# Patient Record
Sex: MALE | Race: WHITE | NOT HISPANIC OR LATINO | URBAN - METROPOLITAN AREA
[De-identification: names, ages, dates, MRNs, and addresses within clinical notes are randomized per-mention and may not be internally consistent; named-entity substitution may affect disease eponyms.]

---

## 2017-02-24 NOTE — PATIENT DISCUSSION
Discussed increased risk of floaters, glaucoma, myopic degeneration and retinal detachment associated with high myopia.

## 2017-06-09 ENCOUNTER — IMPORTED ENCOUNTER (OUTPATIENT)
Dept: URBAN - METROPOLITAN AREA CLINIC 1 | Facility: CLINIC | Age: 72
End: 2017-06-09

## 2017-06-09 PROBLEM — H35.033: Noted: 2017-06-09

## 2017-06-09 PROBLEM — Z79.84: Noted: 2017-06-09

## 2017-06-09 PROBLEM — E11.9: Noted: 2017-06-09

## 2017-06-09 PROBLEM — D31.32: Noted: 2017-06-09

## 2017-06-09 PROBLEM — H25.813: Noted: 2017-06-09

## 2017-06-09 PROBLEM — H35.3132: Noted: 2017-06-09

## 2017-06-09 PROBLEM — H04.123: Noted: 2017-06-09

## 2017-06-09 PROBLEM — H16.143: Noted: 2017-06-09

## 2017-06-09 PROCEDURE — 92012 INTRM OPH EXAM EST PATIENT: CPT

## 2017-06-09 PROCEDURE — 83861 MICROFLUID ANALY TEARS: CPT

## 2017-12-07 ENCOUNTER — IMPORTED ENCOUNTER (OUTPATIENT)
Dept: URBAN - METROPOLITAN AREA CLINIC 1 | Facility: CLINIC | Age: 72
End: 2017-12-07

## 2017-12-07 PROBLEM — H25.813: Noted: 2017-12-07

## 2017-12-07 PROBLEM — E11.9: Noted: 2017-12-07

## 2017-12-07 PROBLEM — H35.033: Noted: 2017-12-07

## 2017-12-07 PROBLEM — H35.3132: Noted: 2017-12-07

## 2017-12-07 PROBLEM — H16.143: Noted: 2017-12-07

## 2017-12-07 PROBLEM — Z79.84: Noted: 2017-12-07

## 2017-12-07 PROBLEM — H04.123: Noted: 2017-12-07

## 2017-12-07 PROBLEM — D31.32: Noted: 2017-12-07

## 2017-12-07 PROCEDURE — 92014 COMPRE OPH EXAM EST PT 1/>: CPT

## 2017-12-07 PROCEDURE — 92015 DETERMINE REFRACTIVE STATE: CPT

## 2018-02-06 ENCOUNTER — IMPORTED ENCOUNTER (OUTPATIENT)
Dept: URBAN - METROPOLITAN AREA CLINIC 1 | Facility: CLINIC | Age: 73
End: 2018-02-06

## 2018-02-06 PROBLEM — H25.812: Noted: 2018-02-06

## 2018-02-06 PROCEDURE — 92136 OPHTHALMIC BIOMETRY: CPT

## 2018-02-14 ENCOUNTER — IMPORTED ENCOUNTER (OUTPATIENT)
Dept: URBAN - METROPOLITAN AREA CLINIC 1 | Facility: CLINIC | Age: 73
End: 2018-02-14

## 2018-02-15 ENCOUNTER — IMPORTED ENCOUNTER (OUTPATIENT)
Dept: URBAN - METROPOLITAN AREA CLINIC 1 | Facility: CLINIC | Age: 73
End: 2018-02-15

## 2018-02-15 PROBLEM — Z96.1: Noted: 2018-02-15

## 2018-02-15 PROCEDURE — 99024 POSTOP FOLLOW-UP VISIT: CPT

## 2018-02-20 ENCOUNTER — IMPORTED ENCOUNTER (OUTPATIENT)
Dept: URBAN - METROPOLITAN AREA CLINIC 1 | Facility: CLINIC | Age: 73
End: 2018-02-20

## 2018-02-20 PROBLEM — H25.811: Noted: 2018-02-20

## 2018-02-20 PROBLEM — Z96.1: Noted: 2018-02-20

## 2018-02-20 PROCEDURE — 92136 OPHTHALMIC BIOMETRY: CPT

## 2018-02-28 ENCOUNTER — IMPORTED ENCOUNTER (OUTPATIENT)
Dept: URBAN - METROPOLITAN AREA CLINIC 1 | Facility: CLINIC | Age: 73
End: 2018-02-28

## 2018-03-01 ENCOUNTER — IMPORTED ENCOUNTER (OUTPATIENT)
Dept: URBAN - METROPOLITAN AREA CLINIC 1 | Facility: CLINIC | Age: 73
End: 2018-03-01

## 2018-03-01 PROBLEM — Z96.1: Noted: 2018-03-01

## 2018-03-01 PROCEDURE — 99024 POSTOP FOLLOW-UP VISIT: CPT

## 2018-03-23 ENCOUNTER — IMPORTED ENCOUNTER (OUTPATIENT)
Dept: URBAN - METROPOLITAN AREA CLINIC 1 | Facility: CLINIC | Age: 73
End: 2018-03-23

## 2018-03-23 PROBLEM — Z96.1: Noted: 2018-03-23

## 2018-03-23 PROCEDURE — 99024 POSTOP FOLLOW-UP VISIT: CPT

## 2018-07-27 ENCOUNTER — IMPORTED ENCOUNTER (OUTPATIENT)
Dept: URBAN - METROPOLITAN AREA CLINIC 1 | Facility: CLINIC | Age: 73
End: 2018-07-27

## 2018-07-27 PROBLEM — H04.123: Noted: 2018-07-27

## 2018-07-27 PROBLEM — H26.493: Noted: 2018-07-27

## 2018-07-27 PROBLEM — E11.9: Noted: 2018-07-27

## 2018-07-27 PROBLEM — H35.3132: Noted: 2018-07-27

## 2018-07-27 PROBLEM — H16.143: Noted: 2018-07-27

## 2018-07-27 PROBLEM — Z79.84: Noted: 2018-07-27

## 2018-07-27 PROBLEM — D31.32: Noted: 2018-07-27

## 2018-07-27 PROBLEM — Z96.1: Noted: 2018-07-27

## 2018-07-27 PROCEDURE — 92014 COMPRE OPH EXAM EST PT 1/>: CPT

## 2019-01-28 ENCOUNTER — IMPORTED ENCOUNTER (OUTPATIENT)
Dept: URBAN - METROPOLITAN AREA CLINIC 1 | Facility: CLINIC | Age: 74
End: 2019-01-28

## 2019-01-28 PROBLEM — H35.3132: Noted: 2019-01-28

## 2019-01-28 PROBLEM — H26.493: Noted: 2019-01-28

## 2019-01-28 PROBLEM — H04.123: Noted: 2019-01-28

## 2019-01-28 PROBLEM — D31.32: Noted: 2019-01-28

## 2019-01-28 PROBLEM — H16.143: Noted: 2019-01-28

## 2019-01-28 PROCEDURE — 92015 DETERMINE REFRACTIVE STATE: CPT

## 2019-01-28 PROCEDURE — 92012 INTRM OPH EXAM EST PATIENT: CPT

## 2019-03-15 ENCOUNTER — IMPORTED ENCOUNTER (OUTPATIENT)
Dept: URBAN - METROPOLITAN AREA CLINIC 1 | Facility: CLINIC | Age: 74
End: 2019-03-15

## 2019-03-15 PROBLEM — H26.492: Noted: 2019-03-15

## 2019-03-15 PROCEDURE — 66821 AFTER CATARACT LASER SURGERY: CPT

## 2019-04-05 ENCOUNTER — IMPORTED ENCOUNTER (OUTPATIENT)
Dept: URBAN - METROPOLITAN AREA CLINIC 1 | Facility: CLINIC | Age: 74
End: 2019-04-05

## 2019-04-05 PROBLEM — H26.491: Noted: 2019-04-05

## 2019-04-05 PROCEDURE — 66821 AFTER CATARACT LASER SURGERY: CPT

## 2019-04-19 ENCOUNTER — IMPORTED ENCOUNTER (OUTPATIENT)
Dept: URBAN - METROPOLITAN AREA CLINIC 1 | Facility: CLINIC | Age: 74
End: 2019-04-19

## 2019-04-19 PROCEDURE — 99024 POSTOP FOLLOW-UP VISIT: CPT

## 2019-08-23 ENCOUNTER — IMPORTED ENCOUNTER (OUTPATIENT)
Dept: URBAN - METROPOLITAN AREA CLINIC 1 | Facility: CLINIC | Age: 74
End: 2019-08-23

## 2019-08-23 PROBLEM — H35.033: Noted: 2019-08-23

## 2019-08-23 PROBLEM — Z79.84: Noted: 2019-08-23

## 2019-08-23 PROBLEM — H16.143: Noted: 2019-08-23

## 2019-08-23 PROBLEM — H35.3132: Noted: 2019-08-23

## 2019-08-23 PROBLEM — H04.123: Noted: 2019-08-23

## 2019-08-23 PROBLEM — H43.813: Noted: 2019-08-23

## 2019-08-23 PROBLEM — H35.373: Noted: 2019-08-23

## 2019-08-23 PROBLEM — Z96.1: Noted: 2019-08-23

## 2019-08-23 PROBLEM — D31.32: Noted: 2019-08-23

## 2019-08-23 PROBLEM — E11.9: Noted: 2019-08-23

## 2019-08-23 PROCEDURE — 92014 COMPRE OPH EXAM EST PT 1/>: CPT

## 2020-02-24 ENCOUNTER — IMPORTED ENCOUNTER (OUTPATIENT)
Dept: URBAN - METROPOLITAN AREA CLINIC 1 | Facility: CLINIC | Age: 75
End: 2020-02-24

## 2020-02-24 PROBLEM — H35.373: Noted: 2020-02-24

## 2020-02-24 PROBLEM — E11.9: Noted: 2020-02-24

## 2020-02-24 PROBLEM — H04.123: Noted: 2020-02-24

## 2020-02-24 PROBLEM — Z96.1: Noted: 2020-02-24

## 2020-02-24 PROBLEM — Z79.84: Noted: 2020-02-24

## 2020-02-24 PROBLEM — H35.3132: Noted: 2020-02-24

## 2020-02-24 PROBLEM — H35.033: Noted: 2020-02-24

## 2020-02-24 PROBLEM — H43.813: Noted: 2020-02-24

## 2020-02-24 PROBLEM — D31.32: Noted: 2020-02-24

## 2020-02-24 PROBLEM — H16.143: Noted: 2020-02-24

## 2020-02-24 PROCEDURE — 92012 INTRM OPH EXAM EST PATIENT: CPT

## 2020-05-19 ENCOUNTER — IMPORTED ENCOUNTER (OUTPATIENT)
Dept: URBAN - METROPOLITAN AREA CLINIC 1 | Facility: CLINIC | Age: 75
End: 2020-05-19

## 2020-05-19 PROBLEM — H04.123: Noted: 2020-05-19

## 2020-05-19 PROBLEM — H16.143: Noted: 2020-05-19

## 2020-05-19 PROCEDURE — 99213 OFFICE O/P EST LOW 20 MIN: CPT

## 2020-08-07 ENCOUNTER — IMPORTED ENCOUNTER (OUTPATIENT)
Dept: URBAN - METROPOLITAN AREA CLINIC 1 | Facility: CLINIC | Age: 75
End: 2020-08-07

## 2020-08-07 PROCEDURE — 92015 DETERMINE REFRACTIVE STATE: CPT

## 2020-11-06 ENCOUNTER — IMPORTED ENCOUNTER (OUTPATIENT)
Dept: URBAN - METROPOLITAN AREA CLINIC 1 | Facility: CLINIC | Age: 75
End: 2020-11-06

## 2020-11-06 PROBLEM — H35.373: Noted: 2020-11-06

## 2020-11-06 PROBLEM — H04.123: Noted: 2020-11-06

## 2020-11-06 PROBLEM — E11.9: Noted: 2020-11-06

## 2020-11-06 PROBLEM — H16.143: Noted: 2020-11-06

## 2020-11-06 PROBLEM — H35.3132: Noted: 2020-11-06

## 2020-11-06 PROBLEM — Z79.84: Noted: 2020-11-06

## 2020-11-06 PROCEDURE — 92014 COMPRE OPH EXAM EST PT 1/>: CPT

## 2020-11-06 PROCEDURE — 92015 DETERMINE REFRACTIVE STATE: CPT

## 2021-05-07 ENCOUNTER — IMPORTED ENCOUNTER (OUTPATIENT)
Dept: URBAN - METROPOLITAN AREA CLINIC 1 | Facility: CLINIC | Age: 76
End: 2021-05-07

## 2021-05-07 PROBLEM — H16.143: Noted: 2021-05-07

## 2021-05-07 PROBLEM — E11.9: Noted: 2021-05-07

## 2021-05-07 PROBLEM — H35.3132: Noted: 2021-05-07

## 2021-05-07 PROBLEM — Z79.84: Noted: 2021-05-07

## 2021-05-07 PROBLEM — H35.373: Noted: 2021-05-07

## 2021-05-07 PROBLEM — H04.123: Noted: 2021-05-07

## 2021-05-07 PROBLEM — D31.32: Noted: 2021-05-07

## 2021-05-07 PROCEDURE — 92015 DETERMINE REFRACTIVE STATE: CPT

## 2021-05-07 PROCEDURE — 92012 INTRM OPH EXAM EST PATIENT: CPT

## 2021-11-11 ENCOUNTER — IMPORTED ENCOUNTER (OUTPATIENT)
Dept: URBAN - METROPOLITAN AREA CLINIC 1 | Facility: CLINIC | Age: 76
End: 2021-11-11

## 2021-11-11 PROBLEM — H16.143: Noted: 2021-11-11

## 2021-11-11 PROBLEM — H04.123: Noted: 2021-11-11

## 2021-11-11 PROBLEM — H35.3132: Noted: 2021-11-11

## 2021-11-11 PROBLEM — Z79.84: Noted: 2021-11-11

## 2021-11-11 PROBLEM — E11.9: Noted: 2021-11-11

## 2021-11-11 PROCEDURE — 99214 OFFICE O/P EST MOD 30 MIN: CPT

## 2022-04-02 ASSESSMENT — VISUAL ACUITY
OD_CC: J1+
OS_CC: 20/25
OS_SC: 20/20
OS_SC: 20/20
OD_SC: 20/20
OS_SC: 20/20
OD_SC: 20/20
OS_SC: 20/20
OD_SC: 20/20
OS_CC: 20/20
OS_CC: 20/25-2
OS_CC: 20/30
OD_SC: 20/20
OS_CC: 20/25
OD_CC: 20/20
OS_CC: 20/20
OD_CC: 20/25
OS_SC: 20/20
OD_GLARE: 20/50
OS_SC: 20/20
OD_SC: 20/20
OD_SC: 20/20
OD_GLARE: 20/50
OD_CC: 20/25
OS_CC: J1+
OD_SC: 20/20
OD_CC: J1+
OD_SC: 20/20
OS_GLARE: 20/60
OD_CC: J1+
OS_SC: 20/20
OS_CC: 20/20
OS_CC: J1+
OD_SC: 20/20
OS_CC: 20/20
OD_CC: 20/20
OS_CC: 20/30
OD_CC: 20/50
OS_CC: J1
OS_CC: J1+
OS_CC: 20/20
OD_CC: J1+
OD_CC: 20/30
OS_SC: 20/20
OS_SC: 20/20
OS_GLARE: 20/50
OD_GLARE: 20/400
OD_CC: J1
OS_GLARE: 20/400
OS_CC: J1+
OD_CC: 20/30
OD_CC: 20/50

## 2022-04-02 ASSESSMENT — KERATOMETRY
OS_AXISANGLE2_DEGREES: 082
OD_K2POWER_DIOPTERS: 42.75
OD_K1POWER_DIOPTERS: 44.75
OS_K2POWER_DIOPTERS: 43.00
OD_AXISANGLE_DEGREES: 005
OS_AXISANGLE_DEGREES: 172
OS_K1POWER_DIOPTERS: 45.00
OD_AXISANGLE2_DEGREES: 095

## 2022-04-02 ASSESSMENT — TONOMETRY
OS_IOP_MMHG: 24
OD_IOP_MMHG: 15
OS_IOP_MMHG: 13
OS_IOP_MMHG: 11
OS_IOP_MMHG: 12
OD_IOP_MMHG: 14
OS_IOP_MMHG: 11
OS_IOP_MMHG: 15
OD_IOP_MMHG: 12
OS_IOP_MMHG: 14
OS_IOP_MMHG: 12
OD_IOP_MMHG: 14
OD_IOP_MMHG: 11
OS_IOP_MMHG: 14
OD_IOP_MMHG: 12
OD_IOP_MMHG: 14
OS_IOP_MMHG: 15
OD_IOP_MMHG: 14
OD_IOP_MMHG: 14
OD_IOP_MMHG: 12
OS_IOP_MMHG: 14
OS_IOP_MMHG: 14
OD_IOP_MMHG: 11
OS_IOP_MMHG: 14
OD_IOP_MMHG: 11
OD_IOP_MMHG: 14
OS_IOP_MMHG: 15
OS_IOP_MMHG: 12

## 2022-05-13 ENCOUNTER — COMPREHENSIVE EXAM (OUTPATIENT)
Dept: URBAN - METROPOLITAN AREA CLINIC 1 | Facility: CLINIC | Age: 77
End: 2022-05-13

## 2022-05-13 DIAGNOSIS — H35.373: ICD-10-CM

## 2022-05-13 DIAGNOSIS — E11.9: ICD-10-CM

## 2022-05-13 DIAGNOSIS — H35.3132: ICD-10-CM

## 2022-05-13 PROCEDURE — 92012 INTRM OPH EXAM EST PATIENT: CPT

## 2022-05-13 PROCEDURE — 92134 CPTRZ OPH DX IMG PST SGM RTA: CPT

## 2022-05-13 ASSESSMENT — TONOMETRY
OS_IOP_MMHG: 14
OD_IOP_MMHG: 14

## 2022-05-13 ASSESSMENT — VISUAL ACUITY
OS_SC: 20/20
OD_SC: 20/20-1

## 2022-11-07 ENCOUNTER — COMPREHENSIVE EXAM (OUTPATIENT)
Dept: URBAN - METROPOLITAN AREA CLINIC 1 | Facility: CLINIC | Age: 77
End: 2022-11-07

## 2022-11-07 DIAGNOSIS — H35.373: ICD-10-CM

## 2022-11-07 DIAGNOSIS — Z96.1: ICD-10-CM

## 2022-11-07 DIAGNOSIS — H35.3132: ICD-10-CM

## 2022-11-07 DIAGNOSIS — H16.143: ICD-10-CM

## 2022-11-07 DIAGNOSIS — H04.123: ICD-10-CM

## 2022-11-07 DIAGNOSIS — E11.9: ICD-10-CM

## 2022-11-07 PROCEDURE — 92015 DETERMINE REFRACTIVE STATE: CPT

## 2022-11-07 PROCEDURE — 99214 OFFICE O/P EST MOD 30 MIN: CPT

## 2022-11-07 ASSESSMENT — VISUAL ACUITY
OU_CC: J1+
OD_SC: 20/20
OS_SC: 20/20

## 2022-11-07 ASSESSMENT — TONOMETRY
OD_IOP_MMHG: 10
OS_IOP_MMHG: 10

## 2023-06-01 ENCOUNTER — FOLLOW UP (OUTPATIENT)
Dept: URBAN - METROPOLITAN AREA CLINIC 1 | Facility: CLINIC | Age: 78
End: 2023-06-01

## 2023-06-01 DIAGNOSIS — H16.143: ICD-10-CM

## 2023-06-01 DIAGNOSIS — H04.123: ICD-10-CM

## 2023-06-01 DIAGNOSIS — H35.3134: ICD-10-CM

## 2023-06-01 DIAGNOSIS — D31.32: ICD-10-CM

## 2023-06-01 DIAGNOSIS — H35.373: ICD-10-CM

## 2023-06-01 PROCEDURE — 99213 OFFICE O/P EST LOW 20 MIN: CPT

## 2023-06-01 PROCEDURE — 92134 CPTRZ OPH DX IMG PST SGM RTA: CPT

## 2023-06-01 ASSESSMENT — TONOMETRY
OS_IOP_MMHG: 10
OD_IOP_MMHG: 10

## 2023-06-01 ASSESSMENT — VISUAL ACUITY
OS_SC: 20/20
OD_SC: 20/20

## 2024-01-11 ENCOUNTER — COMPREHENSIVE EXAM (OUTPATIENT)
Dept: URBAN - METROPOLITAN AREA CLINIC 1 | Facility: CLINIC | Age: 79
End: 2024-01-11

## 2024-01-11 DIAGNOSIS — H35.373: ICD-10-CM

## 2024-01-11 DIAGNOSIS — E11.9: ICD-10-CM

## 2024-01-11 DIAGNOSIS — H16.143: ICD-10-CM

## 2024-01-11 DIAGNOSIS — H04.123: ICD-10-CM

## 2024-01-11 DIAGNOSIS — H35.3134: ICD-10-CM

## 2024-01-11 DIAGNOSIS — D31.32: ICD-10-CM

## 2024-01-11 PROCEDURE — 99214 OFFICE O/P EST MOD 30 MIN: CPT

## 2024-01-11 ASSESSMENT — VISUAL ACUITY
OS_SC: 20/25
OD_SC: 20/25

## 2024-01-11 ASSESSMENT — TONOMETRY
OD_IOP_MMHG: 10
OS_IOP_MMHG: 10

## 2024-08-12 ENCOUNTER — FOLLOW UP (OUTPATIENT)
Dept: URBAN - METROPOLITAN AREA CLINIC 1 | Facility: CLINIC | Age: 79
End: 2024-08-12

## 2024-08-12 DIAGNOSIS — H35.373: ICD-10-CM

## 2024-08-12 DIAGNOSIS — H35.3134: ICD-10-CM

## 2024-08-12 PROCEDURE — 99213 OFFICE O/P EST LOW 20 MIN: CPT

## 2024-08-12 PROCEDURE — 92134 CPTRZ OPH DX IMG PST SGM RTA: CPT

## 2024-08-12 ASSESSMENT — TONOMETRY
OD_IOP_MMHG: 11
OS_IOP_MMHG: 11

## 2024-08-12 ASSESSMENT — VISUAL ACUITY
OS_SC: 20/25
OD_SC: 20/30+2

## 2025-02-24 ENCOUNTER — COMPREHENSIVE EXAM (OUTPATIENT)
Age: 80
End: 2025-02-24

## 2025-02-24 DIAGNOSIS — H16.143: ICD-10-CM

## 2025-02-24 DIAGNOSIS — H35.373: ICD-10-CM

## 2025-02-24 DIAGNOSIS — Z96.1: ICD-10-CM

## 2025-02-24 DIAGNOSIS — H35.3134: ICD-10-CM

## 2025-02-24 DIAGNOSIS — D31.32: ICD-10-CM

## 2025-02-24 DIAGNOSIS — H04.123: ICD-10-CM

## 2025-02-24 DIAGNOSIS — E11.9: ICD-10-CM

## 2025-02-24 DIAGNOSIS — H43.813: ICD-10-CM

## 2025-02-24 DIAGNOSIS — H35.033: ICD-10-CM

## 2025-02-24 PROCEDURE — 99214 OFFICE O/P EST MOD 30 MIN: CPT

## 2025-08-25 ENCOUNTER — FOLLOW UP (OUTPATIENT)
Age: 80
End: 2025-08-25

## 2025-08-25 DIAGNOSIS — H16.143: ICD-10-CM

## 2025-08-25 DIAGNOSIS — H35.3134: ICD-10-CM

## 2025-08-25 DIAGNOSIS — E11.3292: ICD-10-CM

## 2025-08-25 DIAGNOSIS — H04.123: ICD-10-CM

## 2025-08-25 PROCEDURE — 92134 CPTRZ OPH DX IMG PST SGM RTA: CPT

## 2025-08-25 PROCEDURE — 99213 OFFICE O/P EST LOW 20 MIN: CPT
